# Patient Record
Sex: MALE | Race: WHITE | NOT HISPANIC OR LATINO | Employment: UNEMPLOYED | URBAN - METROPOLITAN AREA
[De-identification: names, ages, dates, MRNs, and addresses within clinical notes are randomized per-mention and may not be internally consistent; named-entity substitution may affect disease eponyms.]

---

## 2020-10-02 ENCOUNTER — HOSPITAL ENCOUNTER (EMERGENCY)
Facility: HOSPITAL | Age: 25
Discharge: ANOTHER HOSPITAL | End: 2020-10-02
Attending: EMERGENCY MEDICINE
Payer: OTHER GOVERNMENT

## 2020-10-02 VITALS
SYSTOLIC BLOOD PRESSURE: 138 MMHG | TEMPERATURE: 99 F | DIASTOLIC BLOOD PRESSURE: 80 MMHG | BODY MASS INDEX: 21.47 KG/M2 | HEIGHT: 70 IN | RESPIRATION RATE: 14 BRPM | OXYGEN SATURATION: 96 % | WEIGHT: 150 LBS | HEART RATE: 72 BPM

## 2020-10-02 DIAGNOSIS — K04.7 DENTAL ABSCESS: Primary | ICD-10-CM

## 2020-10-02 DIAGNOSIS — K02.9 DENTAL CARIES: ICD-10-CM

## 2020-10-02 DIAGNOSIS — R22.0 FACIAL SWELLING: ICD-10-CM

## 2020-10-02 LAB
ALBUMIN SERPL BCP-MCNC: 4.3 G/DL (ref 3.5–5.2)
ALP SERPL-CCNC: 78 U/L (ref 55–135)
ALT SERPL W/O P-5'-P-CCNC: 16 U/L (ref 10–44)
ANION GAP SERPL CALC-SCNC: 10 MMOL/L (ref 8–16)
AST SERPL-CCNC: 17 U/L (ref 10–40)
BASOPHILS # BLD AUTO: 0.04 K/UL (ref 0–0.2)
BASOPHILS NFR BLD: 0.4 % (ref 0–1.9)
BILIRUB SERPL-MCNC: 0.5 MG/DL (ref 0.1–1)
BUN SERPL-MCNC: 7 MG/DL (ref 6–20)
CALCIUM SERPL-MCNC: 9.9 MG/DL (ref 8.7–10.5)
CHLORIDE SERPL-SCNC: 101 MMOL/L (ref 95–110)
CO2 SERPL-SCNC: 29 MMOL/L (ref 23–29)
CREAT SERPL-MCNC: 0.8 MG/DL (ref 0.5–1.4)
DIFFERENTIAL METHOD: ABNORMAL
EOSINOPHIL # BLD AUTO: 0.1 K/UL (ref 0–0.5)
EOSINOPHIL NFR BLD: 1 % (ref 0–8)
ERYTHROCYTE [DISTWIDTH] IN BLOOD BY AUTOMATED COUNT: 11.9 % (ref 11.5–14.5)
EST. GFR  (AFRICAN AMERICAN): >60 ML/MIN/1.73 M^2
EST. GFR  (NON AFRICAN AMERICAN): >60 ML/MIN/1.73 M^2
GLUCOSE SERPL-MCNC: 80 MG/DL (ref 70–110)
HCT VFR BLD AUTO: 46.6 % (ref 40–54)
HGB BLD-MCNC: 15.7 G/DL (ref 14–18)
IMM GRANULOCYTES # BLD AUTO: 0.02 K/UL (ref 0–0.04)
IMM GRANULOCYTES NFR BLD AUTO: 0.2 % (ref 0–0.5)
LYMPHOCYTES # BLD AUTO: 2.1 K/UL (ref 1–4.8)
LYMPHOCYTES NFR BLD: 19.2 % (ref 18–48)
MCH RBC QN AUTO: 30.4 PG (ref 27–31)
MCHC RBC AUTO-ENTMCNC: 33.7 G/DL (ref 32–36)
MCV RBC AUTO: 90 FL (ref 82–98)
MONOCYTES # BLD AUTO: 1.2 K/UL (ref 0.3–1)
MONOCYTES NFR BLD: 10.8 % (ref 4–15)
NEUTROPHILS # BLD AUTO: 7.4 K/UL (ref 1.8–7.7)
NEUTROPHILS NFR BLD: 68.4 % (ref 38–73)
NRBC BLD-RTO: 0 /100 WBC
PLATELET # BLD AUTO: 274 K/UL (ref 150–350)
PMV BLD AUTO: 11.1 FL (ref 9.2–12.9)
POTASSIUM SERPL-SCNC: 4.2 MMOL/L (ref 3.5–5.1)
PROT SERPL-MCNC: 8.1 G/DL (ref 6–8.4)
RBC # BLD AUTO: 5.17 M/UL (ref 4.6–6.2)
SARS-COV-2 RDRP RESP QL NAA+PROBE: NEGATIVE
SODIUM SERPL-SCNC: 140 MMOL/L (ref 136–145)
WBC # BLD AUTO: 10.77 K/UL (ref 3.9–12.7)

## 2020-10-02 PROCEDURE — 96375 TX/PRO/DX INJ NEW DRUG ADDON: CPT

## 2020-10-02 PROCEDURE — 87040 BLOOD CULTURE FOR BACTERIA: CPT

## 2020-10-02 PROCEDURE — 25000003 PHARM REV CODE 250: Performed by: PHYSICIAN ASSISTANT

## 2020-10-02 PROCEDURE — 99285 EMERGENCY DEPT VISIT HI MDM: CPT | Mod: 25

## 2020-10-02 PROCEDURE — 80053 COMPREHEN METABOLIC PANEL: CPT

## 2020-10-02 PROCEDURE — U0002 COVID-19 LAB TEST NON-CDC: HCPCS

## 2020-10-02 PROCEDURE — 25500020 PHARM REV CODE 255: Performed by: EMERGENCY MEDICINE

## 2020-10-02 PROCEDURE — 96365 THER/PROPH/DIAG IV INF INIT: CPT

## 2020-10-02 PROCEDURE — 85025 COMPLETE CBC W/AUTO DIFF WBC: CPT

## 2020-10-02 PROCEDURE — 63600175 PHARM REV CODE 636 W HCPCS: Performed by: PHYSICIAN ASSISTANT

## 2020-10-02 RX ORDER — MORPHINE SULFATE 4 MG/ML
4 INJECTION, SOLUTION INTRAMUSCULAR; INTRAVENOUS
Status: COMPLETED | OUTPATIENT
Start: 2020-10-02 | End: 2020-10-02

## 2020-10-02 RX ORDER — CLINDAMYCIN PHOSPHATE 600 MG/50ML
600 INJECTION, SOLUTION INTRAVENOUS
Status: COMPLETED | OUTPATIENT
Start: 2020-10-02 | End: 2020-10-02

## 2020-10-02 RX ORDER — MORPHINE SULFATE 4 MG/ML
4 INJECTION, SOLUTION INTRAMUSCULAR; INTRAVENOUS
Status: DISCONTINUED | OUTPATIENT
Start: 2020-10-02 | End: 2020-10-02

## 2020-10-02 RX ADMIN — IOHEXOL 75 ML: 350 INJECTION, SOLUTION INTRAVENOUS at 01:10

## 2020-10-02 RX ADMIN — MORPHINE SULFATE 4 MG: 4 INJECTION INTRAVENOUS at 02:10

## 2020-10-02 RX ADMIN — CLINDAMYCIN IN 5 PERCENT DEXTROSE 600 MG: 12 INJECTION, SOLUTION INTRAVENOUS at 03:10

## 2020-10-02 NOTE — FIRST PROVIDER EVALUATION
Emergency Department TeleTriage Encounter Note      CHIEF COMPLAINT    Chief Complaint   Patient presents with    Facial Swelling     c/o cracked left upper tooth and swelling to the left side of his face. Swelling began 2 days ago.       VITAL SIGNS   Initial Vitals [10/02/20 0952]   BP Pulse Resp Temp SpO2   (!) 163/82 (!) 112 16 98.3 °F (36.8 °C) 97 %      MAP       --            ALLERGIES    Review of patient's allergies indicates:   Allergen Reactions    Sulfa (sulfonamide antibiotics) Anaphylaxis       PROVIDER TRIAGE NOTE  This is a teletriage evaluation of a 25 y.o. male presenting to the ED with c/o left upper dental pain/cracked tooth. Facial swelling noted to left face.  Speaking in full and complete sentences.  No signs respiratory distress.  Initial orders will be placed and care will be transferred to an alternate provider when patient is roomed for a full evaluation. Any additional orders and the final disposition will be determined by that provider.         ORDERS  Labs Reviewed - No data to display    ED Orders (720h ago, onward)    None            Virtual Visit Note: The provider triage portion of this emergency department evaluation and documentation was performed via [email or web address], a HIPAA-compliant telemedicine application, in concert with a tele-presenter in the room. A face to face patient evaluation with one of my colleagues will occur once the patient is placed in an emergency department room.      DISCLAIMER: This note was prepared with [employer] voice recognition transcription software. Garbled syntax, mangled pronouns, and other bizarre constructions may be attributed to that software system.

## 2020-10-02 NOTE — ED PROVIDER NOTES
Encounter Date: 10/2/2020       History     Chief Complaint   Patient presents with    Facial Swelling     c/o cracked left upper tooth and swelling to the left side of his face. Swelling began 2 days ago.     25-year-old male presents to ED with complaint of left upper dental pain and left-sided facial swelling.  Patient states he was working [place] when he cracked left upper tooth and was unable to seek help.  He continued to have discomfort throughout tooth that was overall control with Tylenol.  He then noticed 2 days ago onset of significant left-sided facial swelling, and worsening pain. Pain described at continuous, worsened with touch or eating, severity 10/10. He has continued to take Tylenol but no improvement of the symptoms.  He does endorse having chills yesterday, but denies fever, cough, shortness of breath, chest pain, abdominal pain, visual changes.  He is tolerating secretions and oral fluids, but does endorse having difficulty eating. No other acute complaints at this time.       The history is provided by the patient.     Review of patient's allergies indicates:   Allergen Reactions    Sulfa (sulfonamide antibiotics) Anaphylaxis     History reviewed. No pertinent past medical history.  No past surgical history on file.  History reviewed. No pertinent family history.  Social History     Tobacco Use    Smoking status: Not on file   Substance Use Topics    Alcohol use: Not on file    Drug use: Not on file     Review of Systems   Constitutional: Positive for chills. Negative for activity change and fever.   HENT: Positive for dental problem and facial swelling. Negative for congestion, drooling, sinus pain, sore throat and trouble swallowing.    Eyes: Negative for visual disturbance.   Respiratory: Negative for cough and shortness of breath.    Cardiovascular: Negative for chest pain.   Gastrointestinal: Negative for abdominal pain, nausea and vomiting.   Musculoskeletal: Negative for back  pain, gait problem, joint swelling and neck stiffness.   Neurological: Negative for dizziness, syncope, weakness, light-headedness, numbness and headaches.   Hematological: Does not bruise/bleed easily.       Physical Exam     Initial Vitals [10/02/20 0952]   BP Pulse Resp Temp SpO2   (!) 163/82 (!) 112 16 98.3 °F (36.8 °C) 97 %      MAP       --         Physical Exam    Nursing note and vitals reviewed.  Constitutional: He appears well-developed and well-nourished. He is cooperative.  Non-toxic appearance. He does not appear ill. No distress.   HENT:   Head: Normocephalic.   Mouth/Throat: Uvula is midline and oropharynx is clear and moist. Dental caries present. No uvula swelling.       Left sided facial swelling, see image below. Tenderness noted throughout left maxillary region.   Left upper molar tooth cracked. Gingiva swelling and erythema surrounding region. No trismus. Uvula midline with no oropharyngeal swelling.      Eyes: EOM are normal.   Neck: Normal range of motion. Neck supple.   Cardiovascular: Normal rate, regular rhythm and normal heart sounds.   Pulmonary/Chest: Effort normal and breath sounds normal.   Abdominal: Soft. Normal appearance.   Musculoskeletal: No tenderness.   Neurological: He is alert and oriented to person, place, and time. GCS score is 15. GCS eye subscore is 4. GCS verbal subscore is 5. GCS motor subscore is 6.   Skin: Skin is warm and dry.   Psychiatric: He has a normal mood and affect. His behavior is normal. Thought content normal.             ED Course   Procedures  Labs Reviewed   CBC W/ AUTO DIFFERENTIAL - Abnormal; Notable for the following components:       Result Value    Mono # 1.2 (*)     All other components within normal limits   CULTURE, BLOOD   CULTURE, BLOOD   COMPREHENSIVE METABOLIC PANEL   SARS-COV-2 RNA AMPLIFICATION, QUAL          Imaging Results          CT Maxillofacial With Contrast (Final result)  Result time 10/02/20 14:17:01    Final result by Jan  MD Rodney (10/02/20 14:17:01)                 Impression:      1. Irregular appearance of left 2nd premolar tooth with periapical lucency and contiguous abscess along the left anterior maxilla.  2. Diffuse left facial swelling with submandibular lymphadenopathy, possibly necrotic.  No definite fluid collection, noting limitations due to metallic artifact.      Electronically signed by: Jan Stevens MD  Date:    10/02/2020  Time:    14:17             Narrative:    EXAMINATION:  CT MAXILLOFACIAL WITH CONTRAST    CLINICAL HISTORY:  Dental pain;    TECHNIQUE:  Low dose axial images, sagittal and coronal reformations were obtained through the face.  75 mL Omnipaque 350 intravenous contrast utilized.    COMPARISON:  None    FINDINGS:  Metallic artifact from dental hardware obscures bone and soft tissue detail.    Bony orbits, nasal bones, zygomatic arches, pterygoid plates, maxilla and mandible are intact.  No facial bone fracture.  Temporomandibular joints are aligned.    There is irregular appearance of the left 2nd premolar tooth with periapical lucency and 1.5 x 1.0 x 1.7 cm peripherally enhancing collection anterior to the maxilla.    There is mucoperiosteal thickening as well as a 1.6 cm retention cyst in the left maxillary antrum.  Minimal mucoperiosteal thickening also noted in the right sphenoid sinus.  Remaining paranasal sinuses are appropriately aerated.  No air-fluid level to indicate acute sinusitis.  Mastoid air cells are aerated.    Optic globes, optic nerves, and extraocular muscles are unremarkable.  No infiltration of retrobulbar fat.    There is left facial soft tissue swelling.  There are multiple prominent left-sided submandibular lymph nodes most measuring less than 1 cm in short axis.  Some demonstrate internal heterogeneity concerning for necrosis.                                 Medical Decision Making:   Differential Diagnosis:   Dental carries, dental abscess, infection, gingivitis  ED  Management:  CT, labs    Patient presents with left-sided facial swelling and dental pain.  He reports he cracked tooth [place] 1 month ago, then noticed 2 day onset of worsening left-sided facial pain and swelling.  On examination, significant tenderness and swelling noted throughout left upper dental region surrounding cracked tooth.  No oropharyngeal swelling or erythema.  CT maxillofacial with contrast does show evidence of contiguous abscess along the left anterior maxilla.  Transfer center was contacted to discuss transferred for [facility].  Patient was accepted for transfer to [facility].  Blood cultures were obtained prior to giving patient clindamycin in ED. Patient was discussed with Dr. Lefort, who agrees with the course and dispo.  Other:   I have discussed this case with another health care provider.       <> Summary of the Discussion: 2:55 PM  Contacted transfer center to discuss transfer to [facility]. Transfer likely to [facility]. Awaiting return call    4:30 PM  Contacted transfer center to check status of transfer. Patient was accepted for transfer to [facility] at 3:11pm. OMFS Dr. Kurtis Hughes has accepted patient.                              Clinical Impression:     ICD-10-CM ICD-9-CM   1. Dental abscess  K04.7 522.5   2. Facial swelling  R22.0 784.2   3. Dental caries  K02.9 521.00                          ED Disposition Condition    Transfer to Another Facility Stable                            Edgar Quevedo PA-C  10/02/20 [id number]

## 2020-10-07 LAB
BACTERIA BLD CULT: NORMAL
BACTERIA BLD CULT: NORMAL

## 2021-04-11 ENCOUNTER — OFFICE VISIT (OUTPATIENT)
Dept: URGENT CARE | Facility: CLINIC | Age: 26
End: 2021-04-11
Payer: MEDICAID

## 2021-04-11 VITALS
OXYGEN SATURATION: 98 % | RESPIRATION RATE: 18 BRPM | DIASTOLIC BLOOD PRESSURE: 71 MMHG | SYSTOLIC BLOOD PRESSURE: 109 MMHG | TEMPERATURE: 98 F | HEART RATE: 79 BPM | WEIGHT: 160 LBS | HEIGHT: 70 IN | BODY MASS INDEX: 22.9 KG/M2

## 2021-04-11 DIAGNOSIS — K04.7 DENTAL INFECTION: Primary | ICD-10-CM

## 2021-04-11 DIAGNOSIS — J30.1 SEASONAL ALLERGIC RHINITIS DUE TO POLLEN: ICD-10-CM

## 2021-04-11 PROCEDURE — 99213 OFFICE O/P EST LOW 20 MIN: CPT | Mod: S$GLB,,, | Performed by: FAMILY MEDICINE

## 2021-04-11 PROCEDURE — 99213 PR OFFICE/OUTPT VISIT, EST, LEVL III, 20-29 MIN: ICD-10-PCS | Mod: S$GLB,,, | Performed by: FAMILY MEDICINE

## 2021-04-11 RX ORDER — FLUTICASONE PROPIONATE 50 MCG
1 SPRAY, SUSPENSION (ML) NASAL DAILY
Qty: 18 G | Refills: 3 | Status: SHIPPED | OUTPATIENT
Start: 2021-04-11 | End: 2021-05-11

## 2021-04-11 RX ORDER — AMOXICILLIN 500 MG/1
CAPSULE ORAL
Qty: 40 CAPSULE | Refills: 0 | Status: SHIPPED | OUTPATIENT
Start: 2021-04-11

## 2021-04-11 RX ORDER — GUAIFENESIN 600 MG/1
600 TABLET, EXTENDED RELEASE ORAL 2 TIMES DAILY
Qty: 14 TABLET | Refills: 2 | Status: SHIPPED | OUTPATIENT
Start: 2021-04-11 | End: 2021-04-18

## 2021-08-13 ENCOUNTER — LAB VISIT (OUTPATIENT)
Dept: PRIMARY CARE CLINIC | Facility: OTHER | Age: 26
End: 2021-08-13
Payer: MEDICAID

## 2021-08-13 DIAGNOSIS — R05.9 COUGH: ICD-10-CM

## 2021-08-13 PROCEDURE — U0003 INFECTIOUS AGENT DETECTION BY NUCLEIC ACID (DNA OR RNA); SEVERE ACUTE RESPIRATORY SYNDROME CORONAVIRUS 2 (SARS-COV-2) (CORONAVIRUS DISEASE [COVID-19]), AMPLIFIED PROBE TECHNIQUE, MAKING USE OF HIGH THROUGHPUT TECHNOLOGIES AS DESCRIBED BY CMS-2020-01-R: HCPCS | Performed by: INTERNAL MEDICINE

## 2021-08-15 LAB
SARS-COV-2 RNA RESP QL NAA+PROBE: NOT DETECTED
SARS-COV-2- CYCLE NUMBER: -1